# Patient Record
Sex: FEMALE | Race: WHITE
[De-identification: names, ages, dates, MRNs, and addresses within clinical notes are randomized per-mention and may not be internally consistent; named-entity substitution may affect disease eponyms.]

---

## 2017-12-10 ENCOUNTER — HOSPITAL ENCOUNTER (EMERGENCY)
Dept: HOSPITAL 45 - C.EDB | Age: 32
LOS: 1 days | Discharge: HOME | End: 2017-12-11
Payer: COMMERCIAL

## 2017-12-10 VITALS
BODY MASS INDEX: 43.05 KG/M2 | HEIGHT: 65.98 IN | BODY MASS INDEX: 43.05 KG/M2 | WEIGHT: 267.86 LBS | HEIGHT: 65.98 IN | WEIGHT: 267.86 LBS

## 2017-12-10 VITALS — TEMPERATURE: 97.52 F

## 2017-12-10 DIAGNOSIS — Z84.1: ICD-10-CM

## 2017-12-10 DIAGNOSIS — J45.909: ICD-10-CM

## 2017-12-10 DIAGNOSIS — I10: ICD-10-CM

## 2017-12-10 DIAGNOSIS — F17.200: ICD-10-CM

## 2017-12-10 DIAGNOSIS — H10.9: ICD-10-CM

## 2017-12-10 DIAGNOSIS — J20.9: Primary | ICD-10-CM

## 2017-12-11 VITALS — OXYGEN SATURATION: 94 % | HEART RATE: 85 BPM | SYSTOLIC BLOOD PRESSURE: 122 MMHG | DIASTOLIC BLOOD PRESSURE: 85 MMHG

## 2017-12-11 LAB
ANION GAP SERPL CALC-SCNC: 8 MMOL/L (ref 3–11)
BASOPHILS # BLD: 0.05 K/UL (ref 0–0.2)
BASOPHILS NFR BLD: 0.4 %
BUN SERPL-MCNC: 8 MG/DL (ref 7–18)
BUN/CREAT SERPL: 13.5 (ref 10–20)
CALCIUM SERPL-MCNC: 9 MG/DL (ref 8.5–10.1)
CHLORIDE SERPL-SCNC: 102 MMOL/L (ref 98–107)
CO2 SERPL-SCNC: 26 MMOL/L (ref 21–32)
COMPLETE: YES
CREAT CL PREDICTED SERPL C-G-VRATE: 178.5 ML/MIN
CREAT SERPL-MCNC: 0.59 MG/DL (ref 0.6–1.2)
EOSINOPHIL NFR BLD AUTO: 379 K/UL (ref 130–400)
FLUAV RNA SPEC QL NAA+PROBE: (no result)
FLUBV RNA SPEC QL NAA+PROBE: (no result)
GLUCOSE SERPL-MCNC: 101 MG/DL (ref 70–99)
HCT VFR BLD CALC: 42.1 % (ref 37–47)
IG%: 0.3 %
IMM GRANULOCYTES NFR BLD AUTO: 27.2 %
LYMPHOCYTES # BLD: 3.4 K/UL (ref 1.2–3.4)
MCH RBC QN AUTO: 27.6 PG (ref 25–34)
MCHC RBC AUTO-ENTMCNC: 33.5 G/DL (ref 32–36)
MCV RBC AUTO: 82.4 FL (ref 80–100)
MONOCYTES NFR BLD: 7.4 %
NEUTROPHILS # BLD AUTO: 2.2 %
NEUTROPHILS NFR BLD AUTO: 62.5 %
POTASSIUM SERPL-SCNC: 3.7 MMOL/L (ref 3.5–5.1)
RBC # BLD AUTO: 5.11 M/UL (ref 4.2–5.4)
SODIUM SERPL-SCNC: 136 MMOL/L (ref 136–145)
WBC # BLD AUTO: 12.49 K/UL (ref 4.8–10.8)

## 2017-12-11 NOTE — EMERGENCY ROOM VISIT NOTE
History


First contact with patient:  23:37


Chief Complaint:  SINUS CONGESTION/PRESSURE


Stated Complaint:  RT EYE REDNESS, NASAL PASSAGE/THROAT ON FIRE


Nursing Triage Summary:  


pt c/o symptoms since last tuesday. states "I saw my doctor, it just started as 


a scratchy throat." pt states now she has "swollen glands," sore throat, 


bilateral ear pain, head pain, sinus congestion. right eye ntoed to be 

reddened. 


states intermittent cough, states "sometimes it's nothing and sometimes a lot 


comes up."





History of Present Illness


The patient is a 32 year old female who presents to the Emergency Room with 

complaints of cough, congestion, sore throat, sinus pain and congestion, earache

, and pinkeye for the past week.  Symptoms are steadily getting worse.  She has 

subjective fever and chills.  No temperature was taken.  Patient denies chest 

pain, dyspnea, abdominal pain, neck stiffness, back pain, urinary symptoms.  

She is tolerate by mouth fluids and food.





Review of Systems


See HPI for pertinent positives & negatives. A total of 10 systems reviewed and 

were otherwise negative.





Past Medical/Surgical History


Medical Problems:


(1) Anxiety


(2) Asthma


(3) Bronchitis


(4) Essence-Danlos disease


(5) Emphysema of lung


(6) Fibromyalgia


(7) HTN (hypertension)


(8) HTN (hypertension)


(9) Neuropathy


(10) Pneumonia


(11) Psoriasis


(12) Psoriasis


Surgical Problems:


(1) History of  section








Family History





Cancer


Kidney stones





Social History


Smoking Status:  Current Some Day Smoker


Alcohol Use:  occasionally


Drug Use:  none


Housing Status:  lives with significant other





Current/Historical Medications


Scheduled


Doxycycline Hyclate (Vibramycin), 100 MG PO BID


Omeprazole (Prilosec), 20 MG PO DAILY





Scheduled PRN


Furosemide (Lasix), 40 MG PO DAILY PRN for fluid


Metoprolol Tartrate (Lopressor) (Lopressor), 50 MG PO AMHS PRN for Hypertension





Physical Exam


Vital Signs











  Date Time  Temp Pulse Resp B/P (MAP) Pulse Ox O2 Delivery O2 Flow Rate FiO2


 


17 02:28  85 18 122/85 94   


 


17 01:11  79 16 135/76 95 Room Air  


 


17 00:27  80 18 120/77 93 Room Air  


 


17 00:03      Room Air  


 


12/10/17 23:31 36.4 95 24 106/80 96 Room Air  











Physical Exam


VITALS: Vitals are noted on the nurse's note and reviewed by myself.  Vital 

signs stable.


GENERAL: White female, in no acute distress, nondiaphoretic, well-developed well

-nourished.


SKIN: The skin was without rashes, erythema, edema, or bruising.  There is no 

tenting of the skin.  Capillary reflex less than 2 seconds.


HEAD: Normocephalic atraumatic.  


EARS: External auditory canals clear, tympanic membranes pearly gray without 

erythema or effusion bilaterally.


EYES: Right eye conjunctiva is erythematous with copious yellow discharge 

concerning conjunctivitis,  Pupils equal round and reactive to light and 

accommodation.  Left Conjunctivae without injection, sclerae without icterus.  

Extraocular movements intact.  


NOSE: Patent, turbinates without inflammation or discharge.  Bilateral 

maxillary sinus tenderness.


MOUTH: Mucous membranes moist.    Pharynx without erythema or exudate.  Uvula 

midline.  Airway patent.  Tongue does not deviate.  


NECK: Supple without nuchal rigidity.  No lymphadenopathy.  No thyromegaly.  

Cervical spine is nontender.  No JVD.


HEART: Regular rate and rhythm without murmurs gallops or rubs.


LUNGS: Mild diffuse end expiratory wheezes, without rales or rhonchi.  No 

dullness to percussion.  No retractions or accessory muscle use.


ABDOMEN: Positive bowel sounds x 4.  Normal tympanic percussion.  Soft, 

nontender, without masses or organomegaly.  Hong sign negative.  No guarding 

or rebound tenderness.


MUSCULOSKELETAL: No muscle atrophy, erythema, or edema noted.   


NEURO: Patient was alert and oriented to person place and time.  Normal 

sensation to light and sharp touch.  No focal neurological deficits.





Medical Decision & Procedures


Laboratory Results


17 00:10








Red Blood Count 5.11, Mean Corpuscular Volume 82.4, Mean Corpuscular Hemoglobin 

27.6, Mean Corpuscular Hemoglobin Concent 33.5, Neutrophils (%) (Auto) 62.5, 

Lymphocytes (%) (Auto) 27.2, Monocytes (%) (Auto) 7.4, Eosinophils (%) (Auto) 

2.2, Basophils (%) (Auto) 0.4, Neutrophils # (Auto) 7.79, Lymphocytes # (Auto) 

3.40, Monocytes # (Auto) 0.93, Eosinophils # (Auto) 0.28, Basophils # (Auto) 

0.05





17 00:10

















Test


  12/10/17


23:56 17


00:10 17


00:17


 


Influenza Type A (RT-PCR)


  Neg for Influ


A (NEG) 


  


 


 


Influenza Type A Antigen


  Neg for Influ


A (NEG) 


  


 


 


Influenza Type B Antigen


  Neg for Influ


B (NEG) 


  


 


 


Influenza Type B (RT-PCR)


  Neg for Influ


B (NEG) 


  


 


 


White Blood Count


  


  12.49 K/uL


(4.8-10.8) 


 


 


Red Blood Count


  


  5.11 M/uL


(4.2-5.4) 


 


 


Hemoglobin


  


  14.1 g/dL


(12.0-16.0) 


 


 


Hematocrit  42.1 % (37-47)  


 


Mean Corpuscular Volume


  


  82.4 fL


() 


 


 


Mean Corpuscular Hemoglobin


  


  27.6 pg


(25-34) 


 


 


Mean Corpuscular Hemoglobin


Concent 


  33.5 g/dl


(32-36) 


 


 


Platelet Count


  


  379 K/uL


(130-400) 


 


 


Neutrophils (%) (Auto)  62.5 %  


 


Lymphocytes (%) (Auto)  27.2 %  


 


Monocytes (%) (Auto)  7.4 %  


 


Eosinophils (%) (Auto)  2.2 %  


 


Basophils (%) (Auto)  0.4 %  


 


Neutrophils # (Auto)


  


  7.79 K/uL


(1.4-6.5) 


 


 


Lymphocytes # (Auto)


  


  3.40 K/uL


(1.2-3.4) 


 


 


Monocytes # (Auto)


  


  0.93 K/uL


(0.11-0.59) 


 


 


Eosinophils # (Auto)


  


  0.28 K/uL


(0-0.5) 


 


 


Basophils # (Auto)


  


  0.05 K/uL


(0-0.2) 


 


 


Immature Granulocyte % (Auto)  0.3 %  


 


Immature Granulocyte # (Auto)


  


  0.04 K/uL


(0.00-0.02) 


 


 


Anion Gap


  


  8.0 mmol/L


(3-11) 


 


 


Est Creatinine Clear Calc


Drug Dose 


  178.5 ml/min 


  


 


 


Estimated GFR (


American) 


  138.6 


  


 


 


Estimated GFR (Non-


American 


  119.6 


  


 


 


BUN/Creatinine Ratio  13.5 (10-20)  


 


Calcium Level


  


  9.0 mg/dl


(8.5-10.1) 


 


 


Bedside Lactic Acid Venous


  


  


  2.04 mmol/L


(0.90-1.70)











Medications Administered











 Medications


  (Trade)  Dose


 Ordered  Sig/Melita


 Route  Start Time


 Stop Time Status Last Admin


Dose Admin


 


 Ciprofloxacin HCl


  (Ciprofloxacin


 0.3% Op Soln)  2 drops  NOW  STAT


 OP  12/10/17 23:42


 12/10/17 23:46 DC 17 00:09


2 DROPS


 


 Dexamethasone


 Sodium Phosphate


  (Decadron Inj)  10 mg  NOW  STAT


 IV  12/10/17 23:42


 12/10/17 23:46 DC 17 00:09


10 MG


 


 Albuterol/


 Ipratropium


  (Duoneb)  3 ml  NOW  STAT


 INH  12/10/17 23:42


 12/10/17 23:46 DC 17 00:08


3 ML


 


 Albuterol


  (Ventolin Hfa


 Inhaler)  2 puffs  ONE  STAT


 INH  17 01:56


 17 01:57 DC 17 02:24


2 PUFFS


 


 Oxymetazoline HCl


  (Afrin 0.05%


 Nasal Spray)  1 sprays  NOW  ONCE


 NA  17 02:00


 17 02:01 DC 17 02:22


1 SPRAYS


 


 Doxycycline


 Hyclate


  (Vibramycin Cap)  100 mg  ONE  ONCE


 PO  17 02:00


 17 02:01 DC 17 02:20


100 MG











ED Course


Prior records/ancillary studies reviewed.


Triage Nursing notes reviewed.


 





The patient's history was concerning for cold symptoms and pinkeye





Differential diagnosis:


Etiologies such as viral syndrome, tonsillitis, conjunctivitis, sinusitis, 

streptococcal pharyngitis, mononucleosis, peritonsillar abscess, 

retropharyngeal abscess, otitis, pneumonia, influenza, as well as others were 

entertained.





ER treatment provided:


Nebulizer, Cipro drops, doxycycline


On reassessment the patient felt better.





Diagnostics interpreted by me:


The labs revealed leukocytosis.  Negative flu





Imaging studies:


Chest x-ray with no acute consolidation, pneumothorax or free air per my 

interpretation.


Sinus CT negative sinusitis per stat radiology








This appears to be consistent with bronchitis and conjunctivitis.  Patient's 

been sick for over a week.  I did not started on antibiotics.  She is advised 

to take medicines as directed and to follow-up family care in a few days or 

here in the ER sooner for high fevers, difficulty breathing, neck stiffness, 

worsening signs or symptoms or as needed.  Patient was neurovascularly and 

neurologically intact.  She is well-appearing.  No signs of meningitis.  By the 

evaluation outlined above emergent etiologies such as peritonsillar abscess, 

retropharyngeal abscess, otitis, pneumonia, meningitis, urinary tract infection

, sepsis, bacteremia, as well as others were deemed relatively unlikely.





The pt informed about the findings as listed above. All questions were answered 

and  pleased with the treatment. Return instructions were outlined and the 

patient was discharged in stable condition.





Outpatient prescription management:


Doxycycline





Referral:


The patient was referred back to their primary care physician for follow-up in 

2 to 3 days for a recheck of the current condition.





Case reviewed with my attending





Medical Decision


As above





Medication Reconcilliation


Current Medication List:  was personally reviewed by me





Blood Pressure Screening


Patient's blood pressure:  Normal blood pressure





Impression





 Primary Impression:  


 Acute bronchitis


 Additional Impression:  


 Conjunctivitis





Departure Information


Dispostion


Home / Self-Care





Condition


GOOD





Prescriptions





Doxycycline Hyclate (VIBRAMYCIN) 100 Mg Cap


100 MG PO BID for 7 Days, #14 CAP


   Prov: Oanh Lisa ., JAMMIE         17





Forms


WORK / SCHOOL INSTRUCTIONS, HOME CARE DOCUMENTATION FORM,                      

                                          IMPORTANT VISIT INFORMATION





Patient Instructions


Conjunctivitis, Bronchitis Acute, My [facility]





Additional Instructions





Pinkeye:


Ciloxan 2 drops into affected eye every 2 hrs while awake x 2 days then every 4 

hrs for 5 days for a total of 7 days.  No contacts for 10 days.  Do not rub 

your eyes, and wash hands frequently.  Cool compress for discomfort.  Avoid 

irritants like smoke, wind, and sun.  Throw out eye cosmetics.  Follow up with 

family doctor or eye doctor if symptoms do not start to improve in 48 hrs or if 

not resolved in 7 days.  Return sooner for any change in vision. 





Bronchitis:


Doxycycline 100mg:  Take one pill twice daily for seven days for your 

infection.  Take with food, but avoid dairy.  Avoid prolonged sun exposure 

since this medication makes you temporarily more susceptible to sunburns.  All 

antibiotics can cause diarrhea.  If this occurs and you feel worse or it does 

not resolve in 1-2 days follow up with your doctor or return to the Emergency 

Department as this could be signs of serious underlying problems.  Any 

medication can cause an allergic reaction, stop the pills immediately and 

return to the ER for rash, hives, breathing difficulties, or swelling.





Acetaminophen(Tylenol) may be used for fever or pain.  Use 1000mg every six 

hours as needed.  Avoid using more than 3000mg in a 24 hour period.


(AND/OR)


Ibuprofen(Motrin, Advil) may be used for fever or pain.  Use 600mg every six 

hours as needed.  Take with food.  Avoid using more than 2400mg in a 24 hour 

period.  Do not use 2400mg per day for more than three consecutive days without 

physician direction.  Prolonged inappropriate use can lead to stomach upset or 

ulcers.





Afrin nasal spray: 2-3 sprays to each nostril twice daily as needed for 

congestion.  Do not use for more than 3-4 days because it can lead to worsening 

rebound congestion.


 





Albuterol Inhaler: Take 2 puffs four times daily for seven days, then as needed.





Rest and drink plenty of fluids. Controlling your fever with Tylenol and 

Ibuprofen as above will make you feel better.





Wash your hands after nose blowing, sneezing, or coughing.  Most germs are 

spread through contact, therefore improper hygiene may result in your close 

contacts and loved ones becoming ill just like you.





Continue current medications.





Return to the ER for severe headache, neck stiffness, chest pain, difficulty 

breathing, fevers, vomiting, worsening of your condition, or as needed.





Follow up with your primary physician this week for a recheck of your current 

condition.





Problem Qualifiers








 Primary Impression:  


 Acute bronchitis


 Bronchitis organism:  unspecified organism  Qualified Codes:  J20.9 - Acute 

bronchitis, unspecified

## 2017-12-11 NOTE — DIAGNOSTIC IMAGING REPORT
CT SINUSES-MAXILLOFACIAL W/O



CLINICAL HISTORY: sinus pain/congestion    



COMPARISON STUDY:  None.



TECHNIQUE: CT scan of the paranasal sinuses was performed in the axial plane. 

Coronal reconstructed images were obtained and reviewed.  A dose lowering

technique was utilized adhering to the principles of ALARA.



CT DOSE: 542.12 mGy.cm



FINDINGS:

Visualized portions of the intracranial contents are unremarkable. No orbital

lesions are visualized.



The mastoid air cells are well aerated. There is nasal septal deviation to the

right. The maxillary ethmoid sphenoid and frontal sinuses are clear. The middle

ear cavities are well aerated. The ostiomeatal units are patent bilaterally.

Both ethmoid notches are protected. There is pneumatization of the left middle

turbinate. The frontoethmoidal recesses are patent.

     

   



IMPRESSION: 



1. No evidence of acute sinusitis

2. Nasal septal deviation to the right

3. Pneumatization left middle turbinate

4. The ostiomeatal units are patent bilaterally







Electronically signed by:  Christopher Leong M.D.

12/11/2017 6:58 AM



Dictated Date/Time:  12/11/2017 6:56 AM

## 2017-12-11 NOTE — DIAGNOSTIC IMAGING REPORT
CHEST 2 VIEWS ROUTINE



CLINICAL HISTORY: cough/fever exam



COMPARISON STUDY:  No previous studies for comparison.



FINDINGS: The bones soft tissues and hemidiaphragms are normal. The

cardiomediastinal silhouette is normal. The lungs are clear. The pulmonary

vasculature is normal. 



IMPRESSION:  Negative chest. 











The above report was generated using voice recognition software.  It may contain

grammatical, syntax or spelling errors.









Electronically signed by:  Josue Prakash M.D.

12/11/2017 6:13 AM



Dictated Date/Time:  12/11/2017 6:12 AM

## 2018-08-17 ENCOUNTER — HOSPITAL ENCOUNTER (EMERGENCY)
Dept: HOSPITAL 45 - C.EDB | Age: 33
Discharge: HOME | End: 2018-08-17
Payer: COMMERCIAL

## 2018-08-17 VITALS — SYSTOLIC BLOOD PRESSURE: 131 MMHG | HEART RATE: 89 BPM | DIASTOLIC BLOOD PRESSURE: 83 MMHG | OXYGEN SATURATION: 98 %

## 2018-08-17 VITALS — TEMPERATURE: 98.42 F

## 2018-08-17 VITALS
BODY MASS INDEX: 45.95 KG/M2 | HEIGHT: 65 IN | WEIGHT: 275.8 LBS | WEIGHT: 275.8 LBS | HEIGHT: 65 IN | BODY MASS INDEX: 45.95 KG/M2

## 2018-08-17 DIAGNOSIS — Z87.891: ICD-10-CM

## 2018-08-17 DIAGNOSIS — R61: ICD-10-CM

## 2018-08-17 DIAGNOSIS — Z88.1: ICD-10-CM

## 2018-08-17 DIAGNOSIS — J45.909: ICD-10-CM

## 2018-08-17 DIAGNOSIS — R10.9: Primary | ICD-10-CM

## 2018-08-17 DIAGNOSIS — R07.81: ICD-10-CM

## 2018-08-17 DIAGNOSIS — Z88.0: ICD-10-CM

## 2018-08-17 DIAGNOSIS — I10: ICD-10-CM

## 2018-08-17 DIAGNOSIS — Z84.1: ICD-10-CM

## 2018-08-17 NOTE — DIAGNOSTIC IMAGING REPORT
CT OF THE ABDOMEN AND PELVIS WITHOUT CONTRAST



CLINICAL HISTORY: Flank pain and hematuria.    



COMPARISON STUDY:  CT of the abdomen and pelvis August 1, 2016. 



TECHNIQUE: Axial images of the abdomen and pelvis were obtained without IV

contrast. Images were reviewed in the axial, sagittal, and coronal planes.  A

dose lowering technique was utilized adhering to the principles of ALARA.





FINDINGS: Lung bases are clear. No renal, ureteral or bladder calculi are

present. There is no hydronephrosis or hydroureter. There is no perinephric

infiltration. Evaluation the remainder of the abdomen and pelvis is suboptimal

on this unenhanced exam. Unenhanced images of the liver, spleen, adrenal glands

and pancreas are normal. There is no biliary or pancreatic ductal dilatation.

There is no peripancreatic or pericholecystic infiltration. There is no evidence

for a bowel obstruction. The appendix is normal. The ovaries are not enlarged.

There is no free fluid. No lymphadenopathy is present. A linear radiodensity

within the body of the stomach measures approximately 2 cm in length. This is

indeterminate. No suspicious osseous lesions are present. Sensitivity for

detection of urothelial lesions is diminished on this unenhanced exam.



IMPRESSION:  



1. No urinary calculi or hydronephrosis.



2. No bowel obstruction. Normal appendix.



3. 2 cm linear radiodensity within the body of the stomach. This is

indeterminate and an ingested foreign body or possibly metallic, cannot be

excluded.







Electronically signed by:  Ronen Ashraf M.D.

8/17/2018 9:06 PM



Dictated Date/Time:  8/17/2018 8:57 PM

## 2018-08-18 NOTE — EMERGENCY ROOM VISIT NOTE
History


Report prepared by Sharon:  Danielle D'Amico


Under the Supervision of:  MANUEL VelázquezO.


First contact with patient:  20:01


Chief Complaint:  URINARY SYMPTOMS


Stated Complaint:  KDINEY/URETHRA PAIN,LIGHT BLEEDING,DOCTOR REFERRAL





History of Present Illness


The patient is a 32 year old female who presents to the Emergency Room with 

complaints of an episode of urinary symptoms that onset tonight. The patient 

notes that when urinated, there was blood when she wiped. She states that she 

has extreme kidney pain that onset in May. She notes that standing alleviates 

the pain.


The patient complains of nausea, pressure in her urethra, pressure in her 

abdomen, pain in her flanks, pain in her ribs, and diaphoresis. The patient 

denies vaginal discharge, vomiting, and chills. The patient denies a history of 

kidney stones. She states that her last sexual encounter was 4 months ago.





   Source of History:  patient


   Onset:  tonight


   Position:  other (bladder)


   Quality:  other (urinary symptoms)


   Timing:  other (episode)


   Modifying Factors (Relieving):  other (standing)


   Associated Symptoms:  + diaphoresis, + nausea, No chills, No vomiting


Note:


The patient complains of pressure in her urethra, a pressure in her abdomen, 

pain in her flanks, and pain in her ribs. The patient denies vaginal discharge.





Review of Systems


See HPI for pertinent positives & negatives. A total of 10 systems reviewed and 

were otherwise negative.





Past Medical & Surgical


Medical Problems:


(1) Anxiety


(2) Asthma


(3) Bronchitis


(4) Essence-Danlos disease


(5) Emphysema of lung


(6) Fibromyalgia


(7) HTN (hypertension)


(8) HTN (hypertension)


(9) Neuropathy


(10) Pneumonia


(11) Psoriasis


(12) Psoriasis


Surgical Problems:


(1) History of  section








Family History





Cancer


Kidney stones





Social History


Smoking Status:  Former Smoker


Alcohol Use:  occasionally


Drug Use:  none


Housing Status:  lives with significant other





Current/Historical Medications


Scheduled


Apremilast (Otezla 10 & 20 & 30 mg), 20 MG PO AMHS


Omeprazole (Prilosec), 20 MG PO DAILY





Allergies


Coded Allergies:  


     Cefuroxime (Verified  Allergy, Unknown, throat closes, 17)


     Penicillins (Verified  Allergy, Unknown, thorat closes, 17)


Uncoded Allergies:  


     RED DYE 40 (Allergy, Unknown, throat closes, 16)





Physical Exam


Vital Signs











  Date Time  Temp Pulse Resp B/P (MAP) Pulse Ox O2 Delivery O2 Flow Rate FiO2


 


18 21:32  89 18 131/83 98   


 


18 19:42 36.9 86 18 127/79 98 Room Air  











Physical Exam


GENERAL:  Patient is awake, alert, and in no acute distress. Patient is resting 

comfortably and showing no signs of anxiety


EYES: The conjunctivae are clear.  The pupils are round and reactive. 


EARS, NOSE, MOUTH AND THROAT: The nose is without any evidence of any 

deformity. Mucous membranes are moist. Tongue is midline 


NECK: The neck is nontender and supple.


RESPIRATORY: Normal respiratory effort is noted. There is no evidence of 

wheezing rhonchi or rales to auscultation. 


CARDIOVASCULAR:  Regular rate and rhythm noted. There no murmurs rubs or 

gallops normal S1 normal S2 


GASTROINTESTINAL: The abdomen is soft. Bowel sounds are present in all 

quadrants. Abdomen is nontender. 


BACK: bilateral CVA tenderness to percussion. No midline tenderness noted.


MUSCULOSKELETAL/EXTREMITIES: There is no evidence of gross deformity. Full 

range of motion is noted in the hips and shoulders. 


SKIN: There is no obvious evidence of any rash. There are no petechiae, pallor 

or cyanosis noted. 


NEUROLOGIC:  Patient is awake alert and oriented x3.





Medical Decision & Procedures


ER Provider


Diagnostic Interpretation:


Radiology results as stated below per my review and radiologist interpretation:





CT OF THE ABDOMEN AND PELVIS WITHOUT CONTRAST





CLINICAL HISTORY: Flank pain and hematuria.    





COMPARISON STUDY:  CT of the abdomen and pelvis 2016. 





TECHNIQUE: Axial images of the abdomen and pelvis were obtained without IV


contrast. Images were reviewed in the axial, sagittal, and coronal planes.  A


dose lowering technique was utilized adhering to the principles of ALARA.








FINDINGS: Lung bases are clear. No renal, ureteral or bladder calculi are


present. There is no hydronephrosis or hydroureter. There is no perinephric


infiltration. Evaluation the remainder of the abdomen and pelvis is suboptimal


on this unenhanced exam. Unenhanced images of the liver, spleen, adrenal glands


and pancreas are normal. There is no biliary or pancreatic ductal dilatation.


There is no peripancreatic or pericholecystic infiltration. There is no evidence


for a bowel obstruction. The appendix is normal. The ovaries are not enlarged.


There is no free fluid. No lymphadenopathy is present. A linear radiodensity


within the body of the stomach measures approximately 2 cm in length. This is


indeterminate. No suspicious osseous lesions are present. Sensitivity for


detection of urothelial lesions is diminished on this unenhanced exam.





IMPRESSION:  





1. No urinary calculi or hydronephrosis.





2. No bowel obstruction. Normal appendix.





3. 2 cm linear radiodensity within the body of the stomach. This is


indeterminate and an ingested foreign body or possibly metallic, cannot be


excluded.











Electronically signed by:  Ronen Ashraf M.D.


2018 9:06 PM





Dictated Date/Time:  2018 8:57 PM





Laboratory Results











Test


  18


20:05


 


Urine Color YELLOW 


 


Urine Appearance CLEAR (CLEAR) 


 


Urine pH 6.0 (4.5-7.5) 


 


Urine Specific Gravity


  1.021


(1.000-1.030)


 


Urine Protein NEG (NEG) 


 


Urine Glucose (UA) NEG (NEG) 


 


Urine Ketones NEG (NEG) 


 


Urine Occult Blood NEG (NEG) 


 


Urine Nitrite NEG (NEG) 


 


Urine Bilirubin NEG (NEG) 


 


Urine Urobilinogen NEG (NEG) 


 


Urine Leukocyte Esterase NEG (NEG) 


 


Urine WBC (Auto) 1-5 /hpf (0-5) 


 


Urine RBC (Auto) 0-4 /hpf (0-4) 


 


Urine Hyaline Casts (Auto) 1-5 /lpf (0-5) 


 


Urine Epithelial Cells (Auto) >30 /lpf (0-5) 


 


Urine Bacteria (Auto) NEG (NEG) 





Laboratory results per my review.





ED Course


: The patient was evaluated in room A9. A complete history and physical 

examination were performed. 





: Upon reevaluation, the patient is resting comfortably. I discussed the 

results and treatment plan with her. She verbalized agreement of the treatment 

plan. She was discharged home.





Medical Decision


Prior records/ancillary studies reviewed.


Triage Nursing notes reviewed.


Additional history obtained from the family.








Differential diagnosis:


Etiologies such as renal colic, appendicitis, diverticulitis, mesenteric 

ischemia, aortic pathology, infections, inflammatory bowel disease, PUD, 

biliary pathology, UTI, as well as others were entertained.





The patient is a 32-year-old female who presented to the emergency department 

with dysuria.  The patient has had ongoing symptoms for the last few weeks.  

She has been seen by her primary care physician previously.  The patient's 

abdominal exam was not consistent with an acute surgical abdomen.  I discussed 

patient's laboratory and radiographic studies with her.  She was encouraged to 

follow-up with her primary care physician for further evaluation.  It is 

possible that this represents musculoskeletal back pain.  She was encouraged to 

return the emergency department immediately if symptoms change worsen or the 

need arises.





Medication Reconcilliation


Current Medication List:  was personally reviewed by me





Blood Pressure Screening


Patient's blood pressure:  Normal blood pressure





Impression





 Primary Impression:  


 Abdominal pain





Scribe Attestation


The scribe's documentation has been prepared under my direction and personally 

reviewed by me in its entirety. I confirm that the note above accurately 

reflects all work, treatment, procedures, and medical decision making performed 

by me.





Departure Information


Dispostion


Home / Self-Care





Referrals


No Doctor, Assigned (PCP)





Forms


HOME CARE DOCUMENTATION FORM,                                                 

               IMPORTANT VISIT INFORMATION





Patient Instructions


[facility]





Additional Instructions





Call your family doctor to schedule a follow-up appointment.  I would recommend 

a referral to a gastroenterologist if symptoms are not improved.  Continue 

using Maalox or Mylanta as directed for symptomatic relief.  Continue all other 

medications as prescribed.





Problem Qualifiers








 Primary Impression:  


 Abdominal pain


 Abdominal location:  unspecified location  Qualified Codes:  R10.9 - 

Unspecified abdominal pain